# Patient Record
Sex: MALE | Race: WHITE | URBAN - METROPOLITAN AREA
[De-identification: names, ages, dates, MRNs, and addresses within clinical notes are randomized per-mention and may not be internally consistent; named-entity substitution may affect disease eponyms.]

---

## 2018-03-07 ENCOUNTER — OFFICE VISIT (OUTPATIENT)
Dept: DERMATOLOGY | Facility: CLINIC | Age: 53
End: 2018-03-07
Payer: COMMERCIAL

## 2018-03-07 DIAGNOSIS — L82.0 SEBORRHEIC KERATOSES, INFLAMED: Primary | ICD-10-CM

## 2018-03-07 ASSESSMENT — PAIN SCALES - GENERAL: PAINLEVEL: NO PAIN (0)

## 2018-03-07 NOTE — NURSING NOTE
Dermatology Rooming Note    Jonnie Garcia's goals for this visit include:   Chief Complaint   Patient presents with     Skin Check     Jonnie is here today to have a couple spots on his back looked at.      Maddie Clancy MA

## 2018-03-07 NOTE — MR AVS SNAPSHOT
After Visit Summary   3/7/2018    Jonnie Garcia    MRN: [id number]           Patient Information     Date Of Birth          1965        Visit Information        Provider Department      3/7/2018 2:00 PM Grisel Flores PA-C [facility] Dermatology        Care Instructions    Cryotherapy    What is it?    Use of a very cold liquid, such as liquid nitrogen, to freeze and destroy abnormal skin cells that need to be removed    What should I expect?    Tenderness and redness    A small blister that might grow and fill with dark purple blood. There may be crusting.    More than one treatment may be needed if the lesions do not go away.    How do I care for the treated area?    Gently wash the area with your hands when bathing.    Use a thin layer of Vaseline to help with healing. You may use a Band-Aid.     The area should heal within 7-10 days and may leave behind a pink or lighter color.     Do not use an antibiotic or Neosporin ointment.     You may take acetaminophen (Tylenol) for pain.     Call your Doctor if you have:    Severe pain    Signs of infection (warmth, redness, cloudy yellow drainage, and or a bad smell)    Questions or concerns    Who should I call with questions?       [facility]: [phone]       [facility]: [phone]       For urgent needs outside of business hours call the [facility] at [phone]        and ask for the dermatology resident on call            Follow-ups after your visit        Who to contact     Please call your clinic at [phone] to:    Ask questions about your health    Make or cancel appointments    Discuss your medicines    Learn about your test results    Speak to your doctor            Additional Information About Your Visit        [employer] Information     [email or web address] is an electronic gateway that provides easy, online access to your medical records. With [email or web address], you can  request a clinic appointment, read your test results, renew a prescription or communicate with your care team.     To sign up for [employer] visit the website at [email or web address]   You will be asked to enter the access code listed below, as well as some personal information. Please follow the directions to create your username and password.     Your access code is: [email or web address]  Expires: 2018  6:30 AM     Your access code will  in 90 days. If you need help or a new code, please contact your [facility] or call [phone] for assistance.        Care EveryWhere ID     This is your Care EveryWhere ID. This could be used by other organizations to access your [place] medical records  [id number]         Blood Pressure from Last 3 Encounters:   13 102/70   07/10/12 102/68   12 118/86    Weight from Last 3 Encounters:   13 74.2 kg (163 lb 8 oz)   07/10/12 77.6 kg (171 lb)   12 77.1 kg (170 lb)              Today, you had the following     No orders found for display       Primary Care Provider Office Phone # Fax #    [place] [phone]       [address] [place] [address] [place] [id number]        Equal Access to Services     [facility] : Hadii aad ku hadasho Soomaali, waaxda luqadaha, qaybta kaalmada adeegyada, waxay casyein hayshanna nolan. So [facility] [phone].    ATENCIÓN: Si habla español, tiene a gill disposición servicios gratuitos de asistencia lingüística. Llnegro al [phone].    We comply with applicable federal civil rights laws and [place] laws. We do not discriminate on the basis of race, color, national origin, age, disability, sex, sexual orientation, or gender identity.            Thank you!     Thank you for choosing [facility]  for your care. Our goal is always to provide you with excellent care. Hearing back from our patients is one way we can continue to  improve our services. Please take a few minutes to complete the written survey that you may receive in the mail after your visit with us. Thank you!             Your Updated Medication List - Protect others around you: Learn how to safely use, store and throw away your medicines at www.disposemymeds.org.          This list is accurate as of 3/7/18  2:09 PM.  Always use your most recent med list.                   Brand Name Dispense Instructions for use Diagnosis    cyanocobalamin 1000 MCG tablet    vitamin  B-12     Take  by mouth daily.        FLORINEF PO      Take 100 mcg by mouth.        Levothyroxine Sodium 112 MCG Caps      Take  by mouth.        PREDNISONE PO      Take 5 mg by mouth.        vitamin D 45962 UNIT capsule    ERGOCALCIFEROL     Take 50,000 Units by mouth. Take one 3 times a week

## 2018-03-07 NOTE — PATIENT INSTRUCTIONS
Cryotherapy    What is it?    Use of a very cold liquid, such as liquid nitrogen, to freeze and destroy abnormal skin cells that need to be removed    What should I expect?    Tenderness and redness    A small blister that might grow and fill with dark purple blood. There may be crusting.    More than one treatment may be needed if the lesions do not go away.    How do I care for the treated area?    Gently wash the area with your hands when bathing.    Use a thin layer of Vaseline to help with healing. You may use a Band-Aid.     The area should heal within 7-10 days and may leave behind a pink or lighter color.     Do not use an antibiotic or Neosporin ointment.     You may take acetaminophen (Tylenol) for pain.     Call your Doctor if you have:    Severe pain    Signs of infection (warmth, redness, cloudy yellow drainage, and or a bad smell)    Questions or concerns    Who should I call with questions?       [facility]: [phone]       [facility]: [phone]       For urgent needs outside of business hours call the [facility] at [phone]        and ask for the dermatology resident on call

## 2018-03-07 NOTE — PROGRESS NOTES
[facility] Dermatology Note      Dermatology Problem List:  1.Irritated SKs s/p cryo    CC:   Chief Complaint   Patient presents with     Skin Check     Jonnie is here today to have a couple spots on his back looked at.        Encounter Date: Mar 7, 2018    History of Present Illness:  Mr. Jonnie Garcia is a 53 year old male who with no history of skin cancer presents for a spot check. He has several new spots on his back which he has noticed over the past year. They are somewhat itchy. He has not tried to treat them in any way. He has not used moisturizers/lotions. He has been healthy recently. The patient denies painful, itching, tingling or bleeding lesions unless otherwise noted. 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     Past Medical History:   There is no problem list on file for this patient.    Past Medical History:   Diagnosis Date     Malignant neoplasm (H)      Multiple endocrine neoplasia (H) 1978    seen by internest and endocrinologist      Past Surgical History:   Procedure Laterality Date     ADRENALECTOMY  1995     HEAD & NECK SURGERY  1995    lymphnode dissection     THYROIDECTOMY  1982       Social History:  The patient works as a musician - plays the cello. The patient denies use of tanning beds.    Family History:  There is no family history of skin cancer.    Medications:  Current Outpatient Prescriptions   Medication Sig Dispense Refill     Levothyroxine Sodium 112 MCG CAPS Take  by mouth.       cyanocolbalamin (VITAMIN  B-12) 1000 MCG tablet Take  by mouth daily.       vitamin D (ERGOCALCIFEROL) 35387 UNIT capsule Take 50,000 Units by mouth. Take one 3 times a week       PREDNISONE PO Take 5 mg by mouth.         Fludrocortisone Acetate (FLORINEF PO) Take 100 mcg by mouth.         doxycycline (VIBRA-TABS) 100 MG tablet Take 1 tablet by mouth 2 times daily.  (Patient not taking: Reported on 3/7/2018) 20 tablet 0     No Known Allergies      Review of Systems:  -Skin/Heme New Pt: The patient denies frequent sun exposure. The patient denies excessive scarring or problems healing except as per HPI. The patient denies excessive bleeding.  -Constitutional: The patient denies fatigue, fevers, chills, unintended weight loss, and night sweats.  -Skin: As above in HPI. No additional skin concerns.    Physical exam:  Vitals: There were no vitals taken for this visit.  GEN: This is a well developed, well-nourished male in no acute distress, in a pleasant mood.    SKIN: Waist-up skin, which includes the head/face, neck, both arms, chest, back, abdomen, digits and/or nails was examined.  -There are waxy stuck on tan to brown papules on the back which are acutely inflamed.  -Two well healed linear scars on the abdomen 2/2 adrenalectomy  -No other lesions of concern on areas examined.     Impression/Plan:  1. Inflamed SK x 2 - back    Cryotherapy procedure note: After verbal consent and discussion of risks and benefits including but no limited to dyspigmentation/scar, blister, and pain, 2 was(were) treated with 1-2mm freeze border for 2 cycles with liquid nitrogen. Post cryotherapy instructions were provided.     F/u in 2 mo if these do not resolve for further evauation      CC Dr. Owens on close of this encounter.  Follow-up prn for new or changing lesions.       Staff Involved:  Staff Only  All risks, benefits and alternatives were discussed with patient.  Patient is in agreement and understands the assessment and plan.  All questions were answered.    Grisel Flores PA-C  [facility]: Phone: [phone], Fax: [phone]

## 2018-03-07 NOTE — LETTER
3/7/2018       RE: Jonnie Garcia  [address] [place] [id number]     Dear Colleague,    Thank you for referring your patient, Jonnie Garcia, to the [facility] DERMATOLOGY at [facility]. Please see a copy of my visit note below.    [facility] Dermatology Note      Dermatology Problem List:  1.Irritated SKs s/p cryo    CC:   Chief Complaint   Patient presents with     Skin Check     Jonnie is here today to have a couple spots on his back looked at.        Encounter Date: Mar 7, 2018    History of Present Illness:  Mr. Jonnie Garcia is a 53 year old male who with no history of skin cancer presents for a spot check. He has several new spots on his back which he has noticed over the past year. They are somewhat itchy. He has not tried to treat them in any way. He has not used moisturizers/lotions. He has been healthy recently. The patient denies painful, itching, tingling or bleeding lesions unless otherwise noted. 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     Past Medical History:   There is no problem list on file for this patient.    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 The patient denies use of tanning beds.    Family History:  There is no family history of skin cancer.    Medications:  Current Outpatient Prescriptions   Medication Sig Dispense Refill     Levothyroxine Sodium 112 MCG CAPS Take  by mouth.       cyanocolbalamin (VITAMIN  B-12) 1000 MCG tablet Take  by mouth daily.        vitamin D (ERGOCALCIFEROL) 39906 UNIT capsule Take 50,000 Units by mouth. Take one 3 times a week       PREDNISONE PO Take 5 mg by mouth.         Fludrocortisone Acetate (FLORINEF PO) Take 100 mcg by mouth.         doxycycline (VIBRA-TABS) 100 MG tablet Take 1 tablet by mouth 2 times daily. (Patient not taking: Reported on 3/7/2018) 20 tablet 0     No Known Allergies      Review of Systems:  -Skin/Heme New Pt: The patient denies frequent sun exposure. The patient denies excessive scarring or problems healing except as per HPI. The patient denies excessive bleeding.  -Constitutional: The patient denies fatigue, fevers, chills, unintended weight loss, and night sweats.  -Skin: As above in HPI. No additional skin concerns.    Physical exam:  Vitals: There were no vitals taken for this visit.  GEN: This is a well developed, well-nourished male in no acute distress, in a pleasant mood.    SKIN: Waist-up skin, which includes the head/face, neck, both arms, chest, back, abdomen, digits and/or nails was examined.  -There are waxy stuck on tan to brown papules on the back which are acutely inflamed.  -Two well healed linear scars on the abdomen 2/2 adrenalectomy  -No other lesions of concern on areas examined.     Impression/Plan:  1. Inflamed SK x 2 - back    Cryotherapy procedure note: After verbal consent and discussion of risks and benefits including but no limited to dyspigmentation/scar, blister, and pain, 2 was(were) treated with 1-2mm freeze border for 2 cycles with liquid nitrogen. Post cryotherapy instructions were provided.     F/u in 2 mo if these do not resolve for further evauation        Staff Involved:  Staff Only  All risks, benefits and alternatives were discussed with patient.  Patient is in agreement and understands the assessment and plan.  All questions were answered.    Grisel Flores PA-C  [facility]: Phone: [phone], Fax:  [phone]        CC Dr. Owens on close of this encounter.  Follow-up prn for new or changing lesions.